# Patient Record
Sex: MALE | URBAN - METROPOLITAN AREA
[De-identification: names, ages, dates, MRNs, and addresses within clinical notes are randomized per-mention and may not be internally consistent; named-entity substitution may affect disease eponyms.]

---

## 2024-06-11 ENCOUNTER — HOSPITAL ENCOUNTER (EMERGENCY)
Age: 24
Discharge: LEFT BEFORE DISCHARGE | End: 2024-06-11

## 2024-06-11 ENCOUNTER — APPOINTMENT (OUTPATIENT)
Dept: GENERAL RADIOLOGY | Age: 24
End: 2024-06-11

## 2024-06-11 VITALS
WEIGHT: 145 LBS | RESPIRATION RATE: 18 BRPM | OXYGEN SATURATION: 100 % | DIASTOLIC BLOOD PRESSURE: 83 MMHG | TEMPERATURE: 97.9 F | BODY MASS INDEX: 20.76 KG/M2 | HEIGHT: 70 IN | HEART RATE: 84 BPM | SYSTOLIC BLOOD PRESSURE: 133 MMHG

## 2024-06-11 DIAGNOSIS — Z53.21 ELOPED FROM EMERGENCY DEPARTMENT: Primary | ICD-10-CM

## 2024-06-11 PROCEDURE — 71046 X-RAY EXAM CHEST 2 VIEWS: CPT

## 2024-06-11 PROCEDURE — 93005 ELECTROCARDIOGRAM TRACING: CPT | Performed by: EMERGENCY MEDICINE

## 2024-06-11 PROCEDURE — 99284 EMERGENCY DEPT VISIT MOD MDM: CPT

## 2024-06-11 RX ORDER — 0.9 % SODIUM CHLORIDE 0.9 %
1000 INTRAVENOUS SOLUTION INTRAVENOUS ONCE
Status: DISCONTINUED | OUTPATIENT
Start: 2024-06-11 | End: 2024-06-12 | Stop reason: HOSPADM

## 2024-06-11 RX ORDER — KETOROLAC TROMETHAMINE 30 MG/ML
30 INJECTION, SOLUTION INTRAMUSCULAR; INTRAVENOUS ONCE
Status: DISCONTINUED | OUTPATIENT
Start: 2024-06-11 | End: 2024-06-12 | Stop reason: HOSPADM

## 2024-06-11 RX ORDER — HYDROXYZINE HYDROCHLORIDE 50 MG/ML
25 INJECTION, SOLUTION INTRAMUSCULAR ONCE
Status: DISCONTINUED | OUTPATIENT
Start: 2024-06-11 | End: 2024-06-12 | Stop reason: HOSPADM

## 2024-06-11 ASSESSMENT — PAIN - FUNCTIONAL ASSESSMENT: PAIN_FUNCTIONAL_ASSESSMENT: 0-10

## 2024-06-11 ASSESSMENT — PAIN DESCRIPTION - LOCATION: LOCATION: CHEST

## 2024-06-11 ASSESSMENT — PAIN SCALES - GENERAL: PAINLEVEL_OUTOF10: 6

## 2024-06-12 NOTE — ED PROVIDER NOTES
department.  Unfortunately, evaluation was not able to be completed and accurate diagnosis was unable to be made.      FINAL IMPRESSION      1. Eloped from emergency department          DISPOSITION/PLAN     DISPOSITION Eloped - Left Before Treatment Complete 06/11/2024 10:31:37 PM  Other Disposition: Eloped.  Patient condition is good.    PATIENT REFERRED TO:  No follow-up provider specified.    DISCHARGE MEDICATIONS:  New Prescriptions    No medications on file       DISCONTINUED MEDICATIONS:  Discontinued Medications    No medications on file       PLAN OF CARE & COUNSELING:  CHARMAINE Rodriguez CNP reviewed today's visit with the patient. This included the differential and plan of care in addition to providing specific details for the plan of care and counseling regarding the diagnosis and prognosis and they are agreeable with the plan. All results reviewed with patient and all questions answered. The patient expressed understanding.      END OF PROVIDER NOTE    I am the primary clinician of record.     Electronically signed by CHARMAINE Rodriguez CNP   DD: 6/11/24    (Please note that portions of this note were completed with a voice recognition program.  Efforts were made to edit the dictations but occasionally words are mis-transcribed.)           Clarita Padilla APRN - CNP  06/11/24 0044

## 2024-06-13 LAB
EKG ATRIAL RATE: 103 BPM
EKG P AXIS: 81 DEGREES
EKG P-R INTERVAL: 160 MS
EKG Q-T INTERVAL: 348 MS
EKG QRS DURATION: 88 MS
EKG QTC CALCULATION (BAZETT): 455 MS
EKG R AXIS: 156 DEGREES
EKG T AXIS: 48 DEGREES
EKG VENTRICULAR RATE: 103 BPM

## 2024-06-13 PROCEDURE — 93010 ELECTROCARDIOGRAM REPORT: CPT | Performed by: INTERNAL MEDICINE
